# Patient Record
Sex: FEMALE | Race: WHITE | NOT HISPANIC OR LATINO | Employment: FULL TIME | URBAN - METROPOLITAN AREA
[De-identification: names, ages, dates, MRNs, and addresses within clinical notes are randomized per-mention and may not be internally consistent; named-entity substitution may affect disease eponyms.]

---

## 2023-01-12 ENCOUNTER — OFFICE VISIT (OUTPATIENT)
Dept: URGENT CARE | Facility: CLINIC | Age: 43
End: 2023-01-12
Payer: COMMERCIAL

## 2023-01-12 VITALS
RESPIRATION RATE: 20 BRPM | WEIGHT: 200 LBS | HEART RATE: 103 BPM | BODY MASS INDEX: 29.62 KG/M2 | HEIGHT: 69 IN | OXYGEN SATURATION: 98 % | DIASTOLIC BLOOD PRESSURE: 99 MMHG | SYSTOLIC BLOOD PRESSURE: 142 MMHG | TEMPERATURE: 99 F

## 2023-01-12 DIAGNOSIS — R05.9 COUGH, UNSPECIFIED TYPE: Primary | ICD-10-CM

## 2023-01-12 DIAGNOSIS — J01.90 ACUTE SINUSITIS, RECURRENCE NOT SPECIFIED, UNSPECIFIED LOCATION: ICD-10-CM

## 2023-01-12 LAB
CTP QC/QA: YES
CTP QC/QA: YES
POC MOLECULAR INFLUENZA A AGN: NEGATIVE
POC MOLECULAR INFLUENZA B AGN: NEGATIVE
SARS-COV-2 RDRP RESP QL NAA+PROBE: NEGATIVE

## 2023-01-12 PROCEDURE — 1160F RVW MEDS BY RX/DR IN RCRD: CPT | Mod: CPTII,,,

## 2023-01-12 PROCEDURE — 99203 PR OFFICE/OUTPT VISIT, NEW, LEVL III, 30-44 MIN: ICD-10-PCS | Mod: ,,,

## 2023-01-12 PROCEDURE — 1160F PR REVIEW ALL MEDS BY PRESCRIBER/CLIN PHARMACIST DOCUMENTED: ICD-10-PCS | Mod: CPTII,,,

## 2023-01-12 PROCEDURE — 3077F SYST BP >= 140 MM HG: CPT | Mod: CPTII,,,

## 2023-01-12 PROCEDURE — 87502 INFLUENZA DNA AMP PROBE: CPT | Mod: QW,,,

## 2023-01-12 PROCEDURE — 1159F PR MEDICATION LIST DOCUMENTED IN MEDICAL RECORD: ICD-10-PCS | Mod: CPTII,,,

## 2023-01-12 PROCEDURE — 3008F PR BODY MASS INDEX (BMI) DOCUMENTED: ICD-10-PCS | Mod: CPTII,,,

## 2023-01-12 PROCEDURE — 3080F PR MOST RECENT DIASTOLIC BLOOD PRESSURE >= 90 MM HG: ICD-10-PCS | Mod: CPTII,,,

## 2023-01-12 PROCEDURE — 1159F MED LIST DOCD IN RCRD: CPT | Mod: CPTII,,,

## 2023-01-12 PROCEDURE — 3080F DIAST BP >= 90 MM HG: CPT | Mod: CPTII,,,

## 2023-01-12 PROCEDURE — 3077F PR MOST RECENT SYSTOLIC BLOOD PRESSURE >= 140 MM HG: ICD-10-PCS | Mod: CPTII,,,

## 2023-01-12 PROCEDURE — 87635: ICD-10-PCS | Mod: QW,,,

## 2023-01-12 PROCEDURE — 87502 POCT INFLUENZA A/B MOLECULAR: ICD-10-PCS | Mod: QW,,,

## 2023-01-12 PROCEDURE — 99203 OFFICE O/P NEW LOW 30 MIN: CPT | Mod: ,,,

## 2023-01-12 PROCEDURE — 3008F BODY MASS INDEX DOCD: CPT | Mod: CPTII,,,

## 2023-01-12 PROCEDURE — 87635 SARS-COV-2 COVID-19 AMP PRB: CPT | Mod: QW,,,

## 2023-01-12 RX ORDER — ALBUTEROL SULFATE 90 UG/1
2 AEROSOL, METERED RESPIRATORY (INHALATION) EVERY 6 HOURS PRN
Qty: 18 G | Refills: 0 | Status: SHIPPED | OUTPATIENT
Start: 2023-01-12 | End: 2024-01-12

## 2023-01-12 RX ORDER — PROMETHAZINE HYDROCHLORIDE AND DEXTROMETHORPHAN HYDROBROMIDE 6.25; 15 MG/5ML; MG/5ML
5 SYRUP ORAL EVERY 6 HOURS PRN
Qty: 118 ML | Refills: 0 | Status: SHIPPED | OUTPATIENT
Start: 2023-01-12 | End: 2023-01-22

## 2023-01-12 RX ORDER — METHYLPREDNISOLONE 4 MG/1
TABLET ORAL
Qty: 21 EACH | Refills: 0 | Status: SHIPPED | OUTPATIENT
Start: 2023-01-12

## 2023-01-12 RX ORDER — NORETHINDRONE 0.35 MG
1 KIT ORAL
COMMUNITY
Start: 2022-10-11

## 2023-01-12 NOTE — PATIENT INSTRUCTIONS
May return tomorrow for nurse visit for repeat swabs or     Repeat COVID swab tomorrow at home, if negative, may begin oral steroids.  Drink plenty of fluids. Get plenty of rest.   Tylenol or Motrin as needed or headache, fever, body aches  Over-the-counter Flonase nasal steroid spray and or Astelin/Astepro antihistamine nasal spray for nasal congestion and postnasal drip.  May also use Zyrtec or Claritin for nasal congestion.  Worry for secondary bacterial infection if symptoms fail to improve after 12 14 days.  Go to the ER with any significant change or worsening of symptoms.   Follow up with your primary care doctor.

## 2023-01-12 NOTE — PROGRESS NOTES
"Subjective:       Patient ID: Mary Ellen Hernandez is a 42 y.o. female.    Vitals:  height is 5' 9" (1.753 m) and weight is 90.7 kg (200 lb). Her temperature is 98.9 °F (37.2 °C). Her blood pressure is 150/101 (abnormal) and her pulse is 103. Her respiration is 20 and oxygen saturation is 98%.     Chief Complaint: Sinus Problem (Pt presents to clinic with c/o nasal congestion, headache, cough starting yesterday. )    Patient is a 42-year-old female who presents to clinic with complaint of nasal congestion, sinus pressure, headache with cough that began yesterday.  Denies known fever, body aches, chills.  Denies neck stiffness, rash, GI symptoms.    ROS    Objective:      Physical Exam   Constitutional: She is oriented to person, place, and time. She appears well-developed. She is cooperative.  Non-toxic appearance. She does not appear ill. No distress.   HENT:   Head: Normocephalic and atraumatic.   Ears:   Right Ear: Hearing, tympanic membrane, external ear and ear canal normal.   Left Ear: Hearing, tympanic membrane, external ear and ear canal normal.   Nose: Rhinorrhea and congestion present. No mucosal edema or nasal deformity. No epistaxis. Right sinus exhibits maxillary sinus tenderness. Right sinus exhibits no frontal sinus tenderness. Left sinus exhibits maxillary sinus tenderness. Left sinus exhibits no frontal sinus tenderness.   Mouth/Throat: Uvula is midline, oropharynx is clear and moist and mucous membranes are normal. Mucous membranes are moist. No trismus in the jaw. Normal dentition. No uvula swelling. No oropharyngeal exudate, posterior oropharyngeal edema or posterior oropharyngeal erythema.   Eyes: Conjunctivae and lids are normal. No scleral icterus.   Neck: Trachea normal and phonation normal. Neck supple. No edema present. No erythema present. No neck rigidity present.   Cardiovascular: Normal rate, regular rhythm, normal heart sounds and normal pulses.   Pulmonary/Chest: Effort normal. No respiratory " distress. She has no decreased breath sounds. She has wheezes. She has no rhonchi. She has no rales.   Abdominal: Normal appearance.   Musculoskeletal: Normal range of motion.         General: No deformity. Normal range of motion.   Neurological: She is alert and oriented to person, place, and time. She exhibits normal muscle tone. Coordination normal.   Skin: Skin is warm, dry, intact, not diaphoretic and not pale.   Psychiatric: Her speech is normal and behavior is normal. Judgment and thought content normal.   Nursing note and vitals reviewed.      Assessment:       1. Cough, unspecified type    2. Acute sinusitis, recurrence not specified, unspecified location            Plan:         Cough, unspecified type  -     POCT COVID-19 Rapid Screening  -     POCT Influenza A/B Molecular  -     promethazine-dextromethorphan (PROMETHAZINE-DM) 6.25-15 mg/5 mL Syrp; Take 5 mLs by mouth every 6 (six) hours as needed (cough).  Dispense: 118 mL; Refill: 0  -     albuterol (VENTOLIN HFA) 90 mcg/actuation inhaler; Inhale 2 puffs into the lungs every 6 (six) hours as needed for Wheezing. Rescue  Dispense: 18 g; Refill: 0    Acute sinusitis, recurrence not specified, unspecified location  -     methylPREDNISolone (MEDROL DOSEPACK) 4 mg tablet; use as directed  Dispense: 21 each; Refill: 0       Symptoms seem consistent with COVID-19,    May return tomorrow for nurse visit for repeat swabs or      Repeat COVID swab tomorrow at home, if negative, may begin oral steroids.  Drink plenty of fluids. Get plenty of rest.   Tylenol or Motrin as needed or headache, fever, body aches  Over-the-counter Flonase nasal steroid spray and or Astelin/Astepro antihistamine nasal spray for nasal congestion and postnasal drip.  May also use Zyrtec or Claritin for nasal congestion.  Worry for secondary bacterial infection if symptoms fail to improve after 12 14 days.  Go to the ER with any significant change or worsening of symptoms.   Follow up with  your primary care doctor.

## 2023-01-13 ENCOUNTER — CLINICAL SUPPORT (OUTPATIENT)
Dept: URGENT CARE | Facility: CLINIC | Age: 43
End: 2023-01-13
Payer: COMMERCIAL

## 2023-01-13 VITALS — OXYGEN SATURATION: 98 % | RESPIRATION RATE: 17 BRPM

## 2023-01-13 DIAGNOSIS — R05.9 COUGH, UNSPECIFIED TYPE: Primary | ICD-10-CM

## 2023-01-13 LAB
CTP QC/QA: YES
SARS-COV-2 RDRP RESP QL NAA+PROBE: NEGATIVE

## 2023-01-13 PROCEDURE — 87635: ICD-10-PCS | Mod: QW,,, | Performed by: FAMILY MEDICINE

## 2023-01-13 PROCEDURE — 87635 SARS-COV-2 COVID-19 AMP PRB: CPT | Mod: QW,,, | Performed by: FAMILY MEDICINE

## 2023-01-13 NOTE — PROGRESS NOTES
Pt presents to the clinic for a nurse visit for a covid reswab in which was negative. Pts oxygen level was 98 % and was given her results in which she expressed understanding.

## 2023-02-24 ENCOUNTER — OFFICE VISIT (OUTPATIENT)
Dept: URGENT CARE | Facility: CLINIC | Age: 43
End: 2023-02-24
Payer: COMMERCIAL

## 2023-02-24 VITALS
BODY MASS INDEX: 29.62 KG/M2 | RESPIRATION RATE: 18 BRPM | TEMPERATURE: 99 F | HEART RATE: 98 BPM | DIASTOLIC BLOOD PRESSURE: 81 MMHG | SYSTOLIC BLOOD PRESSURE: 123 MMHG | HEIGHT: 69 IN | OXYGEN SATURATION: 97 % | WEIGHT: 200 LBS

## 2023-02-24 DIAGNOSIS — L03.011 PARONYCHIA OF FINGER OF RIGHT HAND: Primary | ICD-10-CM

## 2023-02-24 PROCEDURE — 99213 PR OFFICE/OUTPT VISIT, EST, LEVL III, 20-29 MIN: ICD-10-PCS | Mod: 25,,,

## 2023-02-24 PROCEDURE — 3008F PR BODY MASS INDEX (BMI) DOCUMENTED: ICD-10-PCS | Mod: CPTII,,,

## 2023-02-24 PROCEDURE — 99213 OFFICE O/P EST LOW 20 MIN: CPT | Mod: 25,,,

## 2023-02-24 PROCEDURE — 1159F PR MEDICATION LIST DOCUMENTED IN MEDICAL RECORD: ICD-10-PCS | Mod: CPTII,,,

## 2023-02-24 PROCEDURE — 26010 INCISION & DRAINAGE: ICD-10-PCS | Mod: ,,,

## 2023-02-24 PROCEDURE — 3077F PR MOST RECENT SYSTOLIC BLOOD PRESSURE >= 140 MM HG: ICD-10-PCS | Mod: CPTII,,,

## 2023-02-24 PROCEDURE — 3008F BODY MASS INDEX DOCD: CPT | Mod: CPTII,,,

## 2023-02-24 PROCEDURE — 1160F RVW MEDS BY RX/DR IN RCRD: CPT | Mod: CPTII,,,

## 2023-02-24 PROCEDURE — 26010 DRAINAGE OF FINGER ABSCESS: CPT | Mod: ,,,

## 2023-02-24 PROCEDURE — 87070 CULTURE OTHR SPECIMN AEROBIC: CPT

## 2023-02-24 PROCEDURE — 1160F PR REVIEW ALL MEDS BY PRESCRIBER/CLIN PHARMACIST DOCUMENTED: ICD-10-PCS | Mod: CPTII,,,

## 2023-02-24 PROCEDURE — 3080F DIAST BP >= 90 MM HG: CPT | Mod: CPTII,,,

## 2023-02-24 PROCEDURE — 1159F MED LIST DOCD IN RCRD: CPT | Mod: CPTII,,,

## 2023-02-24 PROCEDURE — 3080F PR MOST RECENT DIASTOLIC BLOOD PRESSURE >= 90 MM HG: ICD-10-PCS | Mod: CPTII,,,

## 2023-02-24 PROCEDURE — 3077F SYST BP >= 140 MM HG: CPT | Mod: CPTII,,,

## 2023-02-24 RX ORDER — CEPHALEXIN 500 MG/1
500 CAPSULE ORAL EVERY 6 HOURS
Qty: 40 CAPSULE | Refills: 0 | Status: SHIPPED | OUTPATIENT
Start: 2023-02-24 | End: 2023-03-06

## 2023-02-24 RX ORDER — TRAMADOL HYDROCHLORIDE 50 MG/1
50 TABLET ORAL EVERY 6 HOURS
Qty: 12 TABLET | Refills: 0 | Status: SHIPPED | OUTPATIENT
Start: 2023-02-24 | End: 2023-02-27

## 2023-02-24 RX ORDER — MUPIROCIN 20 MG/G
OINTMENT TOPICAL 3 TIMES DAILY
Qty: 1 G | Refills: 0 | Status: SHIPPED | OUTPATIENT
Start: 2023-02-24 | End: 2023-03-03

## 2023-02-24 NOTE — PROCEDURES
Incision & Drainage    Date/Time: 2/24/2023 8:15 AM  Performed by: ISAAC Villalta  Authorized by: ISAAC Villalta     Consent Done?:  Yes (Verbal)    Type:  Abscess  Body area:  Upper extremity  Location details:  Right long finger  Local anesthetic: Topical anesthetic  Scalpel size: 18 gauge needle used.  Complexity:  Simple  Drainage:  Serosanguinous and pus  Drainage amount:  Scant  Wound treatment:  Wound left open  Patient tolerance:  Patient tolerated the procedure well with no immediate complications

## 2023-02-24 NOTE — PROGRESS NOTES
"Subjective:       Patient ID: Mary Ellen Hernandez is a 42 y.o. female.    Vitals:  height is 5' 9" (1.753 m) and weight is 90.7 kg (200 lb). Her temperature is 98.8 °F (37.1 °C). Her blood pressure is 159/119 (abnormal) and her pulse is 98. Her respiration is 18 and oxygen saturation is 97%.     Chief Complaint: Hand Pain (Right hand middle finger no injury x2d ice, tylenol and ibuprofen )    A 43 y/o female presents to the clinic with c/o swelling and pain to middle finger of right hand x 2 days. She denies any trauma, concern for foreign body, fever, any injury, numbness or tingling to the area, or rash.     Hand Pain       Constitution: Negative for fever.   HENT: Negative.     Neck: neck negative.   Cardiovascular: Negative.    Eyes: Negative.    Respiratory: Negative.     Gastrointestinal: Negative.    Endocrine: negative.   Genitourinary: Negative.    Skin:  Positive for erythema.   Allergic/Immunologic: Negative.      Objective:      Physical Exam   Constitutional: She is oriented to person, place, and time. She appears well-developed. She is cooperative.  Non-toxic appearance. She does not appear ill. No distress.   HENT:   Head: Normocephalic and atraumatic.   Ears:   Right Ear: Hearing normal.   Left Ear: Hearing normal.   Mouth/Throat: Oropharynx is clear and moist and mucous membranes are normal.   Eyes: Lids are normal.   Neck: Trachea normal and phonation normal. Neck supple. No edema present. No erythema present. No neck rigidity present.   Cardiovascular: Normal rate.   Pulmonary/Chest: Effort normal and breath sounds normal. She has no decreased breath sounds.   Abdominal: Normal appearance.   Neurological: She is alert and oriented to person, place, and time. She exhibits normal muscle tone.   Skin: Skin is warm, dry, intact, not diaphoretic, no rash and abscessed (swelling, fluctuance and erythema to distal area of the nailbed of the right middle finger with 0.5cm area of fluctuance and swelling directly " below nail bed). Capillary refill takes less than 2 seconds. erythema and lesion   Psychiatric: Her speech is normal and behavior is normal. Mood and judgment normal.   Nursing note and vitals reviewed.      Assessment:       1. Paronychia of finger of right hand          Plan:         Paronychia of finger of right hand    Other orders  -     traMADoL (ULTRAM) 50 mg tablet; Take 1 tablet (50 mg total) by mouth every 6 (six) hours. for 3 days  Dispense: 12 tablet; Refill: 0  -     cephALEXin (KEFLEX) 500 MG capsule; Take 1 capsule (500 mg total) by mouth every 6 (six) hours. for 10 days  Dispense: 40 capsule; Refill: 0  -     mupirocin (BACTROBAN) 2 % ointment; Apply topically 3 (three) times daily. for 7 days  Dispense: 1 g; Refill: 0    Warm soaks to the area 3-4 times a day  Clean the area with dial soap and pat dry; apply the mupirocin 3 times/day or with dressing changes  Start the antibiotics today and take all of them  Tylenol and motrin as needed for pain  You can take the tramadol for severe pain, caution as it may cause sedation, do not drive or drink while taking it   Monitor for any increased pain, swelling, redness, or fever and return to clinic or seek care immediatly.

## 2023-02-24 NOTE — PATIENT INSTRUCTIONS
Warm soaks to the area 3-4 times a day  Clean the area with dial soap and pat dry; apply the mupirocin 3 times/day or with dressing changes  Start the antibiotics today and take all of them  Tylenol and motrin as needed for pain  You can take the tramadol for severe pain, caution as it may cause sedation, do not drive or drink while taking it   Monitor for any increased pain, swelling, redness, or fever and return to clinic or seek care immediatly.

## 2023-02-26 LAB — BACTERIA SPEC CULT: ABNORMAL

## 2023-02-26 RX ORDER — SULFAMETHOXAZOLE AND TRIMETHOPRIM 800; 160 MG/1; MG/1
1 TABLET ORAL 2 TIMES DAILY
Qty: 14 TABLET | Refills: 0 | Status: SHIPPED | OUTPATIENT
Start: 2023-02-26 | End: 2023-03-05

## 2025-02-18 ENCOUNTER — OFFICE VISIT (OUTPATIENT)
Dept: URGENT CARE | Facility: CLINIC | Age: 45
End: 2025-02-18
Payer: COMMERCIAL

## 2025-02-18 VITALS
TEMPERATURE: 99 F | OXYGEN SATURATION: 98 % | HEART RATE: 83 BPM | DIASTOLIC BLOOD PRESSURE: 99 MMHG | RESPIRATION RATE: 20 BRPM | SYSTOLIC BLOOD PRESSURE: 151 MMHG | WEIGHT: 200 LBS | HEIGHT: 69 IN | BODY MASS INDEX: 29.62 KG/M2

## 2025-02-18 DIAGNOSIS — J02.9 SORE THROAT: Primary | ICD-10-CM

## 2025-02-18 DIAGNOSIS — R05.9 COUGH, UNSPECIFIED TYPE: ICD-10-CM

## 2025-02-18 DIAGNOSIS — J06.9 ACUTE URI: ICD-10-CM

## 2025-02-18 LAB
CTP QC/QA: YES
CTP QC/QA: YES
MOLECULAR STREP A: NEGATIVE
POC MOLECULAR INFLUENZA A AGN: NEGATIVE
POC MOLECULAR INFLUENZA B AGN: NEGATIVE

## 2025-02-18 PROCEDURE — 87502 INFLUENZA DNA AMP PROBE: CPT | Mod: QW,,, | Performed by: PHYSICIAN ASSISTANT

## 2025-02-18 PROCEDURE — 87651 STREP A DNA AMP PROBE: CPT | Mod: QW,,, | Performed by: PHYSICIAN ASSISTANT

## 2025-02-18 RX ORDER — OSELTAMIVIR PHOSPHATE 75 MG/1
75 CAPSULE ORAL 2 TIMES DAILY
Qty: 10 CAPSULE | Refills: 0 | Status: SHIPPED | OUTPATIENT
Start: 2025-02-18 | End: 2025-02-23

## 2025-02-18 RX ORDER — PREDNISONE 10 MG/1
10 TABLET ORAL DAILY
Qty: 5 TABLET | Refills: 0 | Status: SHIPPED | OUTPATIENT
Start: 2025-02-18 | End: 2025-02-23

## 2025-02-18 RX ORDER — PROMETHAZINE HYDROCHLORIDE AND DEXTROMETHORPHAN HYDROBROMIDE 6.25; 15 MG/5ML; MG/5ML
5 SYRUP ORAL EVERY 8 HOURS PRN
Qty: 118 ML | Refills: 0 | Status: SHIPPED | OUTPATIENT
Start: 2025-02-18 | End: 2025-02-23

## 2025-02-18 NOTE — PROGRESS NOTES
"Subjective:      Patient ID: Mary Ellen Hernandez is a 44 y.o. female.    Vitals:  height is 5' 9" (1.753 m) and weight is 90.7 kg (200 lb). Her tympanic temperature is 98.9 °F (37.2 °C). Her blood pressure is 151/99 (abnormal) and her pulse is 83. Her respiration is 20 and oxygen saturation is 98%.     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      URI   This is a new problem. Associated symptoms include congestion, coughing and a sore throat. Pertinent negatives include no diarrhea, sinus pain or vomiting.     Constitution: Positive for chills, fatigue and fever.   HENT:  Positive for congestion, sinus pressure and sore throat. Negative for sinus pain.    Respiratory:  Positive for cough.    Gastrointestinal:  Negative for vomiting and diarrhea.   Musculoskeletal:  Positive for muscle ache.      Objective:     Physical Exam   Constitutional: She is oriented to person, place, and time. She appears well-developed. She is cooperative.  Non-toxic appearance.      Comments:Awake alert ambulatory nasally congested female speaks in complete sentences     HENT:   Head: Normocephalic.   Ears:   Right Ear: Hearing, tympanic membrane, external ear and ear canal normal. Tympanic membrane is not erythematous and not bulging.   Left Ear: Hearing, tympanic membrane, external ear and ear canal normal. Tympanic membrane is not erythematous and not bulging.   Nose: Mucosal edema and congestion present. No rhinorrhea, purulent discharge or nasal deformity. No epistaxis. Right sinus exhibits no maxillary sinus tenderness and no frontal sinus tenderness. Left sinus exhibits no maxillary sinus tenderness and no frontal sinus tenderness.   Mouth/Throat: Uvula is midline, oropharynx is clear and moist and mucous " membranes are normal. Mucous membranes are moist. No trismus in the jaw. Normal dentition. No uvula swelling. No oropharyngeal exudate, posterior oropharyngeal edema or posterior oropharyngeal erythema.      Comments: No edema, no palate petechiae, no muffled voice  Eyes: Conjunctivae and lids are normal. No scleral icterus.   Neck: Trachea normal and phonation normal. Neck supple. No edema present. No erythema present. No neck rigidity present.   Cardiovascular: Normal rate, regular rhythm, normal heart sounds and normal pulses.   No murmur heard.Exam reveals no gallop.   Pulmonary/Chest: Effort normal and breath sounds normal. No stridor. No respiratory distress. She has no decreased breath sounds. She has no wheezes. She has no rhonchi. She has no rales.   Musculoskeletal: Normal range of motion.         General: No swelling. Normal range of motion.      Cervical back: She exhibits no tenderness.   Lymphadenopathy:     She has no cervical adenopathy.   Neurological: no focal deficit. She is alert and oriented to person, place, and time. She exhibits normal muscle tone. Coordination normal.   Skin: Skin is warm, dry, intact, not diaphoretic, not pale and no rash.   Psychiatric: Her speech is normal and behavior is normal. Judgment and thought content normal.   Nursing note and vitals reviewed.         Previous History      Review of patient's allergies indicates:  No Known Allergies    History reviewed. No pertinent past medical history.  Current Outpatient Medications   Medication Instructions    albuterol (VENTOLIN HFA) 90 mcg/actuation inhaler 2 puffs, Inhalation, Every 6 hours PRN, Rescue    methylPREDNISolone (MEDROL DOSEPACK) 4 mg tablet use as directed    NORLYDA 0.35 mg tablet 1 tablet    oseltamivir (TAMIFLU) 75 mg, Oral, 2 times daily    predniSONE (DELTASONE) 10 mg, Oral, Daily    promethazine-dextromethorphan (PROMETHAZINE-DM) 6.25-15 mg/5 mL Syrp 5 mLs, Oral, Every 8 hours PRN     Past Surgical  "History:   Procedure Laterality Date    HERNIA REPAIR       Family History   Problem Relation Name Age of Onset    Hypertension Father      Diabetes Father         Social History[1]     Physical Exam      Vital Signs Reviewed   BP (!) 151/99   Pulse 83   Temp 98.9 °F (37.2 °C) (Tympanic)   Resp 20   Ht 5' 9" (1.753 m)   Wt 90.7 kg (200 lb)   SpO2 98%   BMI 29.53 kg/m²        Procedures    Procedures     Labs     Results for orders placed or performed in visit on 02/18/25   POCT Strep A, Molecular    Collection Time: 02/18/25  4:12 PM   Result Value Ref Range    Molecular Strep A, POC Negative Negative     Acceptable Yes    POCT Influenza A/B Molecular    Collection Time: 02/18/25  4:17 PM   Result Value Ref Range    POC Molecular Influenza A Ag Negative Negative    POC Molecular Influenza B Ag Negative Negative     Acceptable Yes        Assessment:     1. Sore throat    2. Cough, unspecified type    3. Acute URI        Plan:   Discuss with patient negative testing today though high concern for suspected viral illness.  Patient understands discharge plan with symptomatic Rx and paper prescription Tamiflu if unable to return for repeat viral testing or if symptoms progress in the next 24 hours if concern for influenza day window prescription provided.  Patient verbalized understanding is ready for discharge provided work note.    High concern for acute viral upper respiratory illness today.  Negative flu, negative strep testing today    If in the next 24 hours fever chills body aches develop may start Tamiflu or return to urgent care if repeat viral testing needed.  Recommend daily non sedating antihistamine Claritin Zyrtec or loratadine over the next 1-2 weeks for mild-to-moderate nasal allergies with Benadryl nightly if needed for severe nasal allergies.  Recommend Sudafed or Coricidin decongestant over the next week if needed for nasal congestion with 3 day limited Afrin or " Simón-Synephrine nasal spray.  Recommend continue rotating decongestant antihistamine nasal spray over the next week as needed for upper respiratory symptoms.  Recommend Robitussin, Delsym, Dimetapp or prescription Phenergan DM if needed for severe cough congestion body aches and rest.  Do not take sedating medication drive or operate machinery.  May add oral steroid after breakfast to help reduce cough congestion inflammation.  Cover cough at all times washing sanitized hands and surfaces regularly.  May also home COVID test for rule out.    Recommend follow-up with primary care physician in 1-2 weeks for re-evaluation if not improving.  Recommended emergency department evaluation immediately if respiratory symptoms worsen  Sore throat  -     POCT Strep A, Molecular    Cough, unspecified type  -     POCT Influenza A/B Molecular    Acute URI    Other orders  -     oseltamivir (TAMIFLU) 75 MG capsule; Take 1 capsule (75 mg total) by mouth 2 (two) times daily. for 5 days  Dispense: 10 capsule; Refill: 0  -     promethazine-dextromethorphan (PROMETHAZINE-DM) 6.25-15 mg/5 mL Syrp; Take 5 mLs by mouth every 8 (eight) hours as needed (cough).  Dispense: 118 mL; Refill: 0  -     predniSONE (DELTASONE) 10 MG tablet; Take 1 tablet (10 mg total) by mouth once daily. for 5 days  Dispense: 5 tablet; Refill: 0                         [1]   Social History  Tobacco Use    Smoking status: Never    Smokeless tobacco: Never   Substance Use Topics    Alcohol use: Yes

## 2025-02-18 NOTE — PATIENT INSTRUCTIONS
High concern for acute viral upper respiratory illness today.  Negative flu, negative strep testing today    If in the next 24 hours fever chills body aches develop may start Tamiflu or return to urgent care if repeat viral testing needed.  Recommend daily non sedating antihistamine Claritin Zyrtec or loratadine over the next 1-2 weeks for mild-to-moderate nasal allergies with Benadryl nightly if needed for severe nasal allergies.  Recommend Sudafed or Coricidin decongestant over the next week if needed for nasal congestion with 3 day limited Afrin or Simón-Synephrine nasal spray.  Recommend continue rotating decongestant antihistamine nasal spray over the next week as needed for upper respiratory symptoms.  Recommend Robitussin, Delsym, Dimetapp or prescription Phenergan DM if needed for severe cough congestion body aches and rest.  Do not take sedating medication drive or operate machinery.  May add oral steroid after breakfast to help reduce cough congestion inflammation.  Cover cough at all times washing sanitized hands and surfaces regularly.  May also home COVID test for rule out.    Recommend follow-up with primary care physician in 1-2 weeks for re-evaluation if not improving.  Recommended emergency department evaluation immediately if respiratory symptoms worsen